# Patient Record
Sex: FEMALE | URBAN - METROPOLITAN AREA
[De-identification: names, ages, dates, MRNs, and addresses within clinical notes are randomized per-mention and may not be internally consistent; named-entity substitution may affect disease eponyms.]

---

## 2021-07-21 ENCOUNTER — APPOINTMENT (RX ONLY)
Dept: URBAN - METROPOLITAN AREA CLINIC 45 | Facility: CLINIC | Age: 56
Setting detail: DERMATOLOGY
End: 2021-07-21

## 2021-07-21 DIAGNOSIS — B35.1 TINEA UNGUIUM: ICD-10-CM | Status: STABLE

## 2021-07-21 DIAGNOSIS — L23.9 ALLERGIC CONTACT DERMATITIS, UNSPECIFIED CAUSE: ICD-10-CM

## 2021-07-21 DIAGNOSIS — L82.1 OTHER SEBORRHEIC KERATOSIS: ICD-10-CM | Status: STABLE

## 2021-07-21 PROCEDURE — ? PRESCRIPTION

## 2021-07-21 PROCEDURE — ? COUNSELING

## 2021-07-21 PROCEDURE — ? ADDITIONAL NOTES

## 2021-07-21 PROCEDURE — 99203 OFFICE O/P NEW LOW 30 MIN: CPT

## 2021-07-21 RX ORDER — CLOBETASOL PROPIONATE 0.5 MG/G
CREAM TOPICAL BID
Qty: 1 | Refills: 0 | Status: ERX | COMMUNITY
Start: 2021-07-21

## 2021-07-21 RX ADMIN — CLOBETASOL PROPIONATE: 0.5 CREAM TOPICAL at 00:00

## 2021-07-21 ASSESSMENT — LOCATION ZONE DERM
LOCATION ZONE: EYELID
LOCATION ZONE: ARM
LOCATION ZONE: TRUNK

## 2021-07-21 ASSESSMENT — LOCATION SIMPLE DESCRIPTION DERM
LOCATION SIMPLE: LEFT UPPER ARM
LOCATION SIMPLE: RIGHT INFERIOR EYELID
LOCATION SIMPLE: CHEST
LOCATION SIMPLE: ABDOMEN
LOCATION SIMPLE: RIGHT UPPER ARM

## 2021-07-21 ASSESSMENT — LOCATION DETAILED DESCRIPTION DERM
LOCATION DETAILED: RIGHT ANTERIOR DISTAL UPPER ARM
LOCATION DETAILED: LEFT MEDIAL SUPERIOR CHEST
LOCATION DETAILED: PERIUMBILICAL SKIN
LOCATION DETAILED: LEFT ANTERIOR DISTAL UPPER ARM
LOCATION DETAILED: RIGHT MEDIAL INFERIOR EYELID

## 2021-07-21 NOTE — HPI: RASH
What Type Of Note Output Would You Prefer (Optional)?: Bullet Format
How Severe Is Your Rash?: moderate
Is This A New Presentation, Or A Follow-Up?: Rash
Additional History: NP Patient also used Nexcare patches. patient states she has not touch plants or flowers nor bug

## 2021-07-21 NOTE — PROCEDURE: ADDITIONAL NOTES
Additional Notes: Patient consent was obtained to proceed with the visit and recommended plan of care after discussion of all risks and benefits, including the risks of COVID-19 exposure.
Detail Level: Simple
Render Risk Assessment In Note?: no
Additional Notes: Patient received kenalog injections from PCP last Monday. If rash is not better with topicals in 3 to 4 days it is ok to send in oral Prednisone per NF.  pt likely has allergy to neosporin and contact derm from everything she was appplying (different adhesives, hydrogen peroxide and OTC creams)

## 2023-10-24 ENCOUNTER — APPOINTMENT (RX ONLY)
Dept: URBAN - METROPOLITAN AREA CLINIC 45 | Facility: CLINIC | Age: 58
Setting detail: DERMATOLOGY
End: 2023-10-24

## 2023-10-24 DIAGNOSIS — Z02.9 ENCOUNTER FOR ADMINISTRATIVE EXAMINATIONS, UNSPECIFIED: ICD-10-CM

## 2023-10-24 DIAGNOSIS — Z41.9 ENCOUNTER FOR PROCEDURE FOR PURPOSES OTHER THAN REMEDYING HEALTH STATE, UNSPECIFIED: ICD-10-CM

## 2023-10-24 PROCEDURE — ? PRODUCT LINE (REVISION)

## 2023-10-24 PROCEDURE — ? COSMETIC QUOTE

## 2023-10-24 ASSESSMENT — LOCATION ZONE DERM
LOCATION ZONE: FACE
LOCATION ZONE: LIP

## 2023-10-24 ASSESSMENT — LOCATION DETAILED DESCRIPTION DERM
LOCATION DETAILED: LEFT NASOLABIAL FOLD
LOCATION DETAILED: RIGHT CENTRAL MALAR CHEEK

## 2023-10-24 ASSESSMENT — LOCATION SIMPLE DESCRIPTION DERM
LOCATION SIMPLE: LEFT LIP
LOCATION SIMPLE: RIGHT CHEEK

## 2023-10-24 NOTE — PROCEDURE: PRODUCT LINE (REVISION)
Name Of Product 19: Nectifirm
Name Of Product 2: Intellishade
Name Of Product 4: Jenny
Product 20 Application Directions: Apply all over neck morning and/or night\\nUnder eye fat pads
Product 11 Price (In Dollars - Numeric Only, No Special Characters Or $): 56.00
Product 40 Price (In Dollars - Numeric Only, No Special Characters Or $): 84.00
Name Of Product 43: Brightening Pads
Name Of Product 9: DEJ Face Night Cream
Name Of Product 14: Nu-Cil
Product 35 Application Directions: apply under eye every night
Allow Plan To Count Towards E/M Coding: Yes
Product 25 Units: 0
Product 23 Price (In Dollars - Numeric Only, No Special Characters Or $): 225.00
Product 45 Application Directions: Deep
Product 16 Units: 1
Product 33 Price (In Dollars - Numeric Only, No Special Characters Or $): 158.00
Product 16 Application Directions: Apply morning and night all over eyes
Name Of Product 48: Obaig Hydrate
Product 6 Application Directions: Apply every night after cleansing, including eye lids.
Product 28 Price (In Dollars - Numeric Only, No Special Characters Or $): 44.00
Product 25 Application Directions: Apply a pea size of product to your face every morning as your last step before makeup.
Name Of Product 38: Obagi Acne Cleansing Wipes
Product 19 Price (In Dollars - Numeric Only, No Special Characters Or $): 106.00
Name Of Product 36: Finishing Touch
Name Of Product 21: Revox 7
Product 43 Price (In Dollars - Numeric Only, No Special Characters Or $): 87.00
Product 40 Application Directions: This is your anti-aging, tinted moisturizer for day-time use. Contains SPF of 45
Product 9 Price (In Dollars - Numeric Only, No Special Characters Or $): 178.00
Product 14 Price (In Dollars - Numeric Only, No Special Characters Or $): 120.00
Name Of Product 31: REssentials Blemish rescue Serum
Name Of Product 46: Proscriptix Post Procedure Kit
Name Of Product 17: DEJ Face Cream
Product 48 Price (In Dollars - Numeric Only, No Special Characters Or $): 52.00
Product 21 Price (In Dollars - Numeric Only, No Special Characters Or $): 150.00
Product 33 Application Directions: Apply once per week, rinse thoroughly.
Name Of Product 7: Papaya Enz Cleanser
Name Of Product 26: REssentials Brightening Pads
Product 38 Price (In Dollars - Numeric Only, No Special Characters Or $): 25.00
Name Of Product 41: proscriptix Hydrating barrier Repair
Product 28 Application Directions: Use morning and night to cleanse face
Product 23 Application Directions: Apply daily morning and night
Product 31 Price (In Dollars - Numeric Only, No Special Characters Or $): 51.00
Name Of Product 12: ProPil Vitamins
Name Of Product 34: Pore Allen Mask
Product 43 Application Directions: No HQ
Product 4 Application Directions: Apply daily in the morning under eyes for puffiness and dark circles
Product 2 Application Directions: Apply last in regimen daily in the morning
Product 46 Price (In Dollars - Numeric Only, No Special Characters Or $): 196.00
Product 48 Application Directions: If not getting Revisoin's DEJ
Product 14 Application Directions: Eyelash serum, apply first tube daily and apply second tube every 3 days to maintain
Product 9 Application Directions: Use daily at night after cleansing (Vitamin A)
Product 17 Price (In Dollars - Numeric Only, No Special Characters Or $): 168.00
Product 36 Price (In Dollars - Numeric Only, No Special Characters Or $): 50.00
Product 12 Price (In Dollars - Numeric Only, No Special Characters Or $): 46.00
Name Of Product 24: Tretinoin
Name Of Product 29: Youthful Lip Complex
Product 31 Application Directions: Apply all over or as a spot treatment after cleansing. Use once daily or as needed for acne or folliculitis
Product 21 Application Directions: Apply daily in the morning, especially on picture days
Product 34 Price (In Dollars - Numeric Only, No Special Characters Or $): 58.00
Name Of Product 10: Obagi  4% Hydro-Q
Name Of Product 5: Vitamin k
Name Of Product 3: Obagi Vitamin C with 4% hydro-q
Name Of Product 49: Obagi neck & Dec
Name Of Product 15: Brightening facial Wash
Name Of Product 39: Retinol Complete .05
Name Of Product 44: HQ 2%
Product 17 Application Directions: Apply daily in the morning after serums before sunscreen
Product 46 Application Directions: Use the week follwing every viva or coolpeel treatment
Product 7 Application Directions: Wash face morning and night
Product 24 Price (In Dollars - Numeric Only, No Special Characters Or $): 60.00
Product 44 Price (In Dollars - Numeric Only, No Special Characters Or $): 18.00
Product 12 Application Directions: Take suppliment daily
Name Of Product 32: Ultra Hydrating Treatment
Product 5 Price (In Dollars - Numeric Only, No Special Characters Or $): 62.00
Name Of Product 22: Firming Night Treatment
Product 49 Price (In Dollars - Numeric Only, No Special Characters Or $): 250.00
Product 10 Price (In Dollars - Numeric Only, No Special Characters Or $): 109.00
Name Of Product 1: C+ Correcting complex 30% vitamin C
Product 3 Price (In Dollars - Numeric Only, No Special Characters Or $): 139.00
Product 36 Application Directions: Apply to dry skin once per week, add a small amount of water in a circular motion across entire face to exfoliate
Product 39 Price (In Dollars - Numeric Only, No Special Characters Or $): 110.00
Name Of Product 18: Lumiquin
Name Of Product 27: Intellishade Matte
Product 29 Application Directions: Apply at night for a softening/moisturizing treatment on lips
Detail Level: Zone
Name Of Product 47: Antioxident Infused gentle Cleanser
Name Of Product 8: Obagi Hydro Drops
Product 34 Application Directions: Apply a dime size to entire face 3 to 4 times per week
Name Of Product 42: Proscriptix Clarifying Brightening Polish
Product 32 Price (In Dollars - Numeric Only, No Special Characters Or $): 105.00
Name Of Product 13: Serum
Product 22 Price (In Dollars - Numeric Only, No Special Characters Or $): 90.00
Product 1 Price (In Dollars - Numeric Only, No Special Characters Or $): 176.00
Product 24 Application Directions: Apply a small amount to face nighly, can start with 3 night per week and work up to every night. Avoid eyes.
Name Of Product 37: Super Charged C Serum
Product 18 Price (In Dollars - Numeric Only, No Special Characters Or $): 64.00
Product 5 Application Directions: Apply every morning after Vitamin C, before moisturizer or sunscreen
Name Of Product 20: Nectifirm Advanced
Product 15 Application Directions: Cleanse face every morning and night. If it starts to dry the skin switch to every other day use.
Product 27 Price (In Dollars - Numeric Only, No Special Characters Or $): 80.00
Product 3 Application Directions: Use daily in the morning (swap with Revision Vitamin C once desired results are achieved / or are taking the break) ( use is for 3 months on and 2 months off)
Name Of Product 35: Obagi Retivance
Product 8 Price (In Dollars - Numeric Only, No Special Characters Or $): 98.00
Name Of Product 30: Proscriptix Advanced Corrective Cream
Product 10 Application Directions: Use nightly ( use is for 3 months on and 2 months off)
Product 32 Application Directions: Apply every morning before lotions.
Product 22 Application Directions: Apply every night
Product 20 Price (In Dollars - Numeric Only, No Special Characters Or $): 154.00
Name Of Product 25: Intellishade Clear
Product 35 Price (In Dollars - Numeric Only, No Special Characters Or $): 133.00
Name Of Product 6: Revox line relaxer
Name Of Product 16: DEJ Eye Cream
Name Of Product 45: Color Science Sunforgettable Brush
Assigning Risk Information: Per AMA, level of risk is based upon consequences of the problem(s) addressed at the encounter when appropriately treated. Risk also includes medical decision making related to the need to initiate or forego further testing, treatment and/or hospitalization. Over the counter medication are assigned a risk level of low. Prescription medication management is assigned a risk level of moderate.
Product 27 Application Directions: Apply every morning as the last step, before makeup. This is your sunscreen
Name Of Product 40: Intellishade Original
Product 1 Application Directions: Apply daily in the morning and at night
Name Of Product 11: Voumizing / Hydrating Shampoo and Conditioner
Name Of Product 50: UpNeeq
Product 18 Application Directions: Apply daily to hands with a pea size for both, can apply twice per day if desired
Risk Of Complication Category: No MDM
Name Of Product 23: DEJ Boosting Serum
Product 50 Price (In Dollars - Numeric Only, No Special Characters Or $): $8
Name Of Product 33: Love
Product 8 Application Directions: Use daily in the morning for Hydration on face, neck, chest and hands.
Product 16 Price (In Dollars - Numeric Only, No Special Characters Or $): 118.00
Name Of Product 28: Gentle Foaming Cleanser
Product 37 Application Directions: Apply every morning before lotions

## 2023-10-24 NOTE — PROCEDURE: COSMETIC QUOTE
Body Procedure 1 Free Text Discount (In Dollars- Use Only Numbers And Decimals): 0.00
Body Procedure 2 Price/Unit (In Dollars- Use Only Numbers And Decimals): 800.00
Injectable 5 Price/Unit (In Dollars- Use Only Numbers And Decimals): 900.00
Laser 18 Percentage Discount: 0
Laser 5: Laser Hair Removal Brazilian
Laser 2: Laser Hair Removal Full Body
Injectable 2 Hutchinson/Unit (In Dollars- Use Only Numbers And Decimals): 50.00
Face Procedure 5 Price/Unit (In Dollars- Use Only Numbers And Decimals): 200.00
Number Of Months: 1
Misc Procedure 1 Price/Unit (In Dollars- Use Only Numbers And Decimals): 25.00
Body Procedure 9: Scalp PRP
Injectable 9: Kybella
Laser 19 Price/Unit (In Dollars- Use Only Numbers And Decimals): 500.00
Body Procedure 6: CoolPeel
Breast Procedure 3 Hutchinson/Unit (In Dollars- Use Only Numbers And Decimals): 789.00
Laser 16 Price/Unit (In Dollars- Use Only Numbers And Decimals): 299.00
Injectable 6: Juvederm Ultra XC
Breast Procedure 1 Price/Unit (In Dollars- Use Only Numbers And Decimals): 1100.00
Body Procedure 3: Viva abdominal Scar
Injectable 3: Botox (forehead)
Laser 13 Price/Unit (In Dollars- Use Only Numbers And Decimals): 199.00
Laser 10 Price/Unit (In Dollars- Use Only Numbers And Decimals): 99.00
Misc Procedure 2: Milia Extractions
Laser 20: Laser Hair Removal Buttocks
Face Procedure 3: Viva Full Face
Face Procedure 6: V-Beam Laser
Laser 17: Laser Hair Removal Abdomen
Laser 3 Price/Unit (In Dollars- Use Only Numbers And Decimals): 675.00
Body Procedure 7: Cristina Kramer
Injectable 10: Tear Trough Filler
Laser 17 Price/Unit (In Dollars- Use Only Numbers And Decimals): 379.00
Body Procedure 4: Add on extractions
Injectable 7: Volux
Include Sales Tax On Surgeon's Fees: Yes
Face Procedure 6 Price/Unit (In Dollars- Use Only Numbers And Decimals): 300.450
Face Procedure 9 Price/Unit (In Dollars- Use Only Numbers And Decimals): 450.00
Injectable 4: Facial Filler
Body Procedure 1: Keravive
Face Procedure 4: Add-On IPL laser
Injectable 1: Botox
Laser 4 Price/Unit (In Dollars- Use Only Numbers And Decimals): 475.00
Face Procedure 1: VI Precision plus peel
Breast Procedure 2: CoolPeel spot treatment
Laser 18: Laser Hair Removal 1/2 Back
Face Procedure 7: IPL
Include Sales Tax On Facility Fees: No
Face Procedure 10: SkinPen micronedling
Laser 15: Laser Hair Removal Full Arms
Laser 12: Laser hair Removal Full Face/ Beard
Body Procedure 5 Price/Unit (In Dollars- Use Only Numbers And Decimals): 350.00
Laser 9: Cherry Angeomas
Injectable 2: Botox lip flip
Face Procedure 2: coolpeel laser full face
Misc Procedure 1: Add-on Milia Extractions
Laser 9 Price/Unit (In Dollars- Use Only Numbers And Decimals): 200-300
Laser 19: Laser Hair Removal Full Back
Laser 5 Price/Unit (In Dollars- Use Only Numbers And Decimals): 399.00
Face Procedure 5: Viva Add on Neck
Breast Procedure 3: Microneedling with PRP
Laser 2 Price/Unit (In Dollars- Use Only Numbers And Decimals): 1000.00
Laser 16: Laser Hair Removal Chest
Body Procedure 9 Price/Unit (In Dollars- Use Only Numbers And Decimals): 899.00
Breast Procedure 1: Arms and Hands CoolPeel
Face Procedure 8: Hydrafacial Deluxe
Laser 13: Laser Hair Removal Under Arms
Injectable 9 Price/Unit (In Dollars- Use Only Numbers And Decimals): 700.00
Laser 6: Laser Hair Removal Bikini
Body Procedure 3 Price/Unit (In Dollars- Use Only Numbers And Decimals): 550.00
Laser 10: Laser hair Removal [place]
Laser 3: Laser Hair Removal Full Legs
Injectable 3 Hutchinson/Unit (In Dollars- Use Only Numbers And Decimals): 15.00
Misc Procedure 2 Price/Unit (In Dollars- Use Only Numbers And Decimals): 20.00
Body Procedure 10: SkinPen Microneedling
Face Procedure 9: IPL Treatment
Laser 14: Laser Hair Removal 1/2 Arms
Body Procedure 7 Price/Unit (In Dollars- Use Only Numbers And Decimals): 1400.00
Laser 7: Laser Hair Removal Beard Outline
Laser 11: Laser Hair Removal Lip/Chin
Body Procedure 4 Price/Unit (In Dollars- Use Only Numbers And Decimals): 75.00
Laser 8: CoolPeel Full face
Laser 4: Laser Hair Removal 1/2 Leg
Laser 1: Add on IPL
Laser 11 Units: 8
Face Procedure 1 Price/Unit (In Dollars- Use Only Numbers And Decimals): 429.00
Detail Level: Zone
Body Procedure 8: Chemical Peel Body
Body Procedure 5: Viva Around mouth only
Laser 11 Percentage Discount: 15
Injectable 8: Lyft
Laser 15 Price/Unit (In Dollars- Use Only Numbers And Decimals): 499.00
Body Procedure 2: Viva with IPL
Injectable 5: Voluma

## 2023-12-05 ENCOUNTER — APPOINTMENT (RX ONLY)
Dept: URBAN - METROPOLITAN AREA CLINIC 45 | Facility: CLINIC | Age: 58
Setting detail: DERMATOLOGY
End: 2023-12-05

## 2023-12-05 DIAGNOSIS — Z41.9 ENCOUNTER FOR PROCEDURE FOR PURPOSES OTHER THAN REMEDYING HEALTH STATE, UNSPECIFIED: ICD-10-CM

## 2023-12-05 PROCEDURE — ? COSMETIC CONSULTATION: HYDRAFACIAL

## 2023-12-05 ASSESSMENT — LOCATION SIMPLE DESCRIPTION DERM: LOCATION SIMPLE: RIGHT CHEEK

## 2023-12-05 ASSESSMENT — LOCATION DETAILED DESCRIPTION DERM: LOCATION DETAILED: RIGHT INFERIOR CENTRAL MALAR CHEEK

## 2023-12-05 ASSESSMENT — LOCATION ZONE DERM: LOCATION ZONE: FACE

## 2023-12-05 NOTE — HPI: COSMETIC CONSULTATION
Additional History: Patient had recently had a cosmetic consultation with Tere Moura [facility].  GABRIELA Burger Front Office staff translated office visit.

## 2024-02-19 ENCOUNTER — APPOINTMENT (RX ONLY)
Dept: URBAN - METROPOLITAN AREA CLINIC 45 | Facility: CLINIC | Age: 59
Setting detail: DERMATOLOGY
End: 2024-02-19

## 2024-02-19 DIAGNOSIS — Z41.9 ENCOUNTER FOR PROCEDURE FOR PURPOSES OTHER THAN REMEDYING HEALTH STATE, UNSPECIFIED: ICD-10-CM

## 2024-02-19 PROCEDURE — ? HYDRAFACIAL

## 2024-02-19 ASSESSMENT — LOCATION SIMPLE DESCRIPTION DERM: LOCATION SIMPLE: RIGHT CHEEK

## 2024-02-19 ASSESSMENT — LOCATION DETAILED DESCRIPTION DERM: LOCATION DETAILED: RIGHT INFERIOR CENTRAL MALAR CHEEK

## 2024-02-19 ASSESSMENT — LOCATION ZONE DERM: LOCATION ZONE: FACE

## 2024-02-19 NOTE — PROCEDURE: HYDRAFACIAL
Procedure: Exfoliation
Indication: skin texture
Consent: Written consent obtained, risks reviewed including but not limited to crusting, scabbing, blistering, scarring, darker or lighter pigmentary change, bruising, and/or incomplete response.
Vacuum Pressure Low Setting (Will Not Render If Set To 0): 0
Procedure: Boost
Solution: GlySal 7.5%
Solution Override: Antiox+
Tip: Hydropeel Tip, Clear
Solution Override
Procedure: Fusion
Post-Care Instructions: I reviewed with the patient in detail post-care instructions. Patient should stay away from the sun and wear sun protection until treated areas are fully healed.After the procedure Revision Intellishade Original sunscreen was applied. \\n\\nPatient tolerated procedure well.  Patient paid $      HydraFacial for treatment today.  Pre and post Deluxe HydraFacial photos are in attachments.
Solution: Activ-4
Number Of Passes Step 1: 2
Tip: Hydropeel Tip, Teal
Location: face
Procedure: Extraction
Procedure: Extend and Protect
Solution Override: REGEN GF
Procedure: Peel
Tip: Hydropeel Tip, Orange Aggression
Vacuum Pressure Low Setting (Will Not Render If Set To 0): 16
Treatment Number: 1
Solution: Beta-HD

## 2024-02-19 NOTE — HPI: OTHER
Condition:: antiaging
Please Describe Your Condition:: Patient presents today for Deluxe HydraFacial treatment.  GABRIELA Burger Front Office staff translated office visit.

## 2024-06-04 ENCOUNTER — APPOINTMENT (RX ONLY)
Dept: URBAN - METROPOLITAN AREA CLINIC 45 | Facility: CLINIC | Age: 59
Setting detail: DERMATOLOGY
End: 2024-06-04

## 2024-06-04 DIAGNOSIS — L98.8 OTHER SPECIFIED DISORDERS OF THE SKIN AND SUBCUTANEOUS TISSUE: ICD-10-CM

## 2024-06-04 PROCEDURE — ? COSMETIC CONSULTATION: FILLERS

## 2024-06-04 PROCEDURE — ? COSMETIC CONSULTATION: BOTULINUM TOXIN

## 2024-06-04 PROCEDURE — ? RECOMMENDATIONS

## 2024-06-04 PROCEDURE — ? COUNSELING

## 2024-06-04 ASSESSMENT — LOCATION SIMPLE DESCRIPTION DERM
LOCATION SIMPLE: LEFT EYELID
LOCATION SIMPLE: INFERIOR FOREHEAD
LOCATION SIMPLE: LEFT CHEEK

## 2024-06-04 ASSESSMENT — LOCATION ZONE DERM
LOCATION ZONE: FACE
LOCATION ZONE: EYELID

## 2024-06-04 ASSESSMENT — LOCATION DETAILED DESCRIPTION DERM
LOCATION DETAILED: LEFT LATERAL CANTHUS
LOCATION DETAILED: LEFT SUPERIOR CENTRAL BUCCAL CHEEK
LOCATION DETAILED: INFERIOR MID FOREHEAD

## 2024-06-04 NOTE — HPI: COSMETIC (PATIENT REPORTED)
What Area Would You Like Treated?: Forehead, glabella and ZULEIMA
Additional Comments (Use Complete Sentences): Established patient was last seen by Soledad here to consult about getting Botox for the first time.

## 2024-06-10 ENCOUNTER — APPOINTMENT (RX ONLY)
Dept: URBAN - METROPOLITAN AREA CLINIC 45 | Facility: CLINIC | Age: 59
Setting detail: DERMATOLOGY
End: 2024-06-10

## 2024-06-10 DIAGNOSIS — Z41.9 ENCOUNTER FOR PROCEDURE FOR PURPOSES OTHER THAN REMEDYING HEALTH STATE, UNSPECIFIED: ICD-10-CM

## 2024-06-10 PROCEDURE — ? COSMETIC CONSULTATION: GENERAL

## 2024-06-10 ASSESSMENT — LOCATION DETAILED DESCRIPTION DERM
LOCATION DETAILED: RIGHT CENTRAL MALAR CHEEK
LOCATION DETAILED: LEFT INFERIOR TEMPLE
LOCATION DETAILED: LEFT SUPERIOR LATERAL MALAR CHEEK
LOCATION DETAILED: RIGHT INFERIOR LATERAL MALAR CHEEK
LOCATION DETAILED: RIGHT SUPERIOR ANTERIOR NECK
LOCATION DETAILED: RIGHT INFERIOR TEMPLE

## 2024-06-10 ASSESSMENT — LOCATION SIMPLE DESCRIPTION DERM
LOCATION SIMPLE: LEFT TEMPLE
LOCATION SIMPLE: RIGHT ANTERIOR NECK
LOCATION SIMPLE: LEFT CHEEK
LOCATION SIMPLE: RIGHT TEMPLE
LOCATION SIMPLE: RIGHT CHEEK

## 2024-06-10 ASSESSMENT — LOCATION ZONE DERM
LOCATION ZONE: NECK
LOCATION ZONE: FACE

## 2024-06-10 NOTE — HPI: COSMETIC CONSULTATION
Additional History: Patient was referred by Thien Lima PA-C for Viva.  Patient only speaks Slovak.  Patient used [employer] to translate office visit.